# Patient Record
Sex: FEMALE | Race: WHITE
[De-identification: names, ages, dates, MRNs, and addresses within clinical notes are randomized per-mention and may not be internally consistent; named-entity substitution may affect disease eponyms.]

---

## 2018-10-07 ENCOUNTER — HOSPITAL ENCOUNTER (EMERGENCY)
Dept: HOSPITAL 9 - MADERS | Age: 63
Discharge: HOME | End: 2018-10-07
Payer: COMMERCIAL
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
## 2018-10-07 DIAGNOSIS — S52.502A: Primary | ICD-10-CM
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
## 2018-10-07 PROCEDURE — 29125 APPL SHORT ARM SPLINT STATIC: CPT

## 2018-10-07 NOTE — RAD
LEFT WRIST 3 VIEWS:

 

HISTORY: 

Left wrist injury.

 

FINDINGS: 

Comminuted impacted fracture of the distal radius is present with minimal posterior displacement of t
he major distal fragment and apex volar angulation.  There is mild loss of inclination.  No definite 
intraarticular extension.  Scaphoid waist and ulnar styloid are intact.

 

IMPRESSION: 

Comminuted distal left radial fracture with dorsal angulation and loss of inclination.

 

POS: [facility]